# Patient Record
Sex: MALE | Race: WHITE
[De-identification: names, ages, dates, MRNs, and addresses within clinical notes are randomized per-mention and may not be internally consistent; named-entity substitution may affect disease eponyms.]

---

## 2017-07-24 ENCOUNTER — HOSPITAL ENCOUNTER (OUTPATIENT)
Dept: HOSPITAL 76 - SDS | Age: 32
Discharge: HOME | End: 2017-07-24
Attending: SURGERY
Payer: COMMERCIAL

## 2017-07-24 VITALS — SYSTOLIC BLOOD PRESSURE: 133 MMHG | DIASTOLIC BLOOD PRESSURE: 76 MMHG

## 2017-07-24 DIAGNOSIS — F17.210: ICD-10-CM

## 2017-07-24 DIAGNOSIS — L05.91: Primary | ICD-10-CM

## 2017-07-24 PROCEDURE — 11771 EXC PILONIDAL CYST XTNSV: CPT

## 2017-07-24 PROCEDURE — 0JB90ZZ EXCISION OF BUTTOCK SUBCUTANEOUS TISSUE AND FASCIA, OPEN APPROACH: ICD-10-PCS | Performed by: SURGERY

## 2017-07-25 NOTE — OPERATIVE REPORT
DATE OF SURGERY:  07/24/2017 00:00:00

 

PREOPERATIVE DIAGNOSIS:   Recurrent pilonidal cysts. 

 

POSTOPERATIVE DIAGNOSIS:   Recurrent pilonidal cysts.

 

NAME OF PROCEDURE:  Excision of pilonidal cyst

 

SURGEON:   Yari Jean MD.

 

 

ANESTHESIOLOGIST: Dr. Yeager.

 

INDICATION FOR PROCEDURE: This is a 32-year-old gentleman who presents with 
recurring pilonidal abscesses status post excision of pilonidal cyst in 2009, 
now with recurring pilonidal cysts in sinus.

 

FINDINGS: After obtaining informed consent from the patient, he was brought 
into the operating room, positioned on the operating table in the prone position
, taking note of pressure points. He was administered sedation by Anesthesia. 
He was administered 2 g of Ancef. He was then prepped and draped in the usual 
sterile fashion, and a timeout was taken according to protocol. Two sinus pits 
were located at the gluteal fold. These pits were excised using an 11-blade, 
circumferentially excising the cysts just around the opening. A contralateral 
incision to the left of the gluteal food was then created approximately 1.5 cm 
in length. This was deepened down with electrocautery through the subcutaneous 
tissue, down towards the presacral fascia using a curette. The pilonidal cysts 
at their base were excised using the curette through the lateral incision. 
Probes were inserted through both openings to ensure the entire cystic cavity 
was removed with a curette. Once this was performed, the cavities were 
copiously irrigated and hemostasis noted to be achieved. The incision was then 
closed with 2-0 Vicryl and 4-0 Monocryl. The cyst sites were then packed with 
quarter-inch iodoform packing. The Dermabond was applied to the lateral 
incision. An ABD pad was then applied and disposable underwear were placed. 
Patient was then taken to the recovery room in stable condition.

 

ESTIMATED BLOOD LOSS: Minimal.

 

COMPLICATIONS: None.

 

SPECIMENS: None. 

 

 

 

DD:07/24/2017 11:18:00  DT: 07/25/2017 17:23  JOB #: [id number]  EXT JOB #:[id number]

MTDD

## 2018-01-24 ENCOUNTER — HOSPITAL ENCOUNTER (EMERGENCY)
Dept: HOSPITAL 76 - ED | Age: 33
Discharge: HOME | End: 2018-01-24
Payer: COMMERCIAL

## 2018-01-24 VITALS — SYSTOLIC BLOOD PRESSURE: 126 MMHG | DIASTOLIC BLOOD PRESSURE: 81 MMHG

## 2018-01-24 DIAGNOSIS — F17.200: ICD-10-CM

## 2018-01-24 DIAGNOSIS — A08.4: Primary | ICD-10-CM

## 2018-01-24 PROCEDURE — 99283 EMERGENCY DEPT VISIT LOW MDM: CPT

## 2018-01-24 NOTE — ED PHYSICIAN DOCUMENTATION
PD HPI NVD





- Stated complaint


Stated Complaint: V/D





- Chief complaint


Chief Complaint: Abd Pain





- History obtained from


History obtained from: Patient





- History of Present Illness


Timing - onset: Last night


Timing - duration: Hours (12)


Timing - details: Abrupt onset


Associated symptoms: Other (diarrhea often last evening and some overnight. 

Vomiting started during the night into this morning.).  No: Fever, Abdominal 

pain, Near syncope / syncope


Contributing factors: Sick contact (his son had similar several days ago, 

lasted 2 days. Coworkers with similar as well.).  No: Bad food, Travel, Recent 

antibiotics


Worsened by: Eating


Similar symptoms before: Has not had sx before


Recently seen: Not recently seen





Review of Systems


Constitutional: reports: Myalgias.  denies: Fever, Chills


Nose: denies: Rhinorrhea / runny nose, Congestion


Throat: denies: Sore throat


Respiratory: denies: Cough


GI: reports: Nausea, Vomiting, Diarrhea.  denies: Abdominal Pain, Hematemesis, 

Bloody / black stool


: denies: Dysuria, Frequency


Neurologic: reports: Generalized weakness, Headache (mild).  denies: Focal 

weakness, Numbness, Difficulty speaking, Near syncope, Confused, Altered mental 

status





PD PAST MEDICAL HISTORY





- Past Medical History


Past Medical History: No


GI: Other (IBS)





- Past Surgical History


Past Surgical History: No





- Present Medications


Home Medications: 


 Ambulatory Orders











 Medication  Instructions  Recorded  Confirmed


 


raNITIdine [Zantac] 150 mg PO PRN PRN 07/24/17 07/24/17


 


Diphenoxylate HCl/Atropine 1 each PO Q6H PRN #12 tablet 01/24/18 





[Diphenoxylate-Atrop 2.5-0.025]   


 


Ondansetron Odt [Zofran] 4 mg TL Q6H PRN #15 tablet 01/24/18 


 


Ranitidine HCl [Acid Reducer] 75 mg PO DAILY 01/24/18 01/24/18














- Allergies


Allergies/Adverse Reactions: 


 Allergies











Allergy/AdvReac Type Severity Reaction Status Date / Time


 


amoxicillin Allergy  Unknown Verified 01/24/18 07:10


 


amphetamine Allergy  Unknown Verified 01/24/18 07:10





[From Adderall XR]     


 


amphetamine aspartate * Allergy  Anaphylaxis Verified 07/24/17 10:16





[From Adderall XR]     


 


amphetamine sulfate * Allergy  Anaphylaxis Verified 07/24/17 10:16





[From Adderall XR]     


 


dextroamphetamine Allergy  Unknown Verified 01/24/18 07:10





[From Adderall XR]     


 


dextroamphetamine saccharate Allergy  Anaphylaxis Verified 07/24/17 10:16





*     





[From Adderall XR]     


 


dextroamphetamine sulfate * Allergy  Anaphylaxis Verified 07/24/17 10:16





[From Adderall XR]     














- Social History


Does the pt smoke?: Yes


Smoking Status: Current every day smoker





- Immunizations


Immunizations are current?: Yes





PD ED PE NORMAL





- Vitals


Vital signs reviewed: Yes





- General


General: Alert and oriented X 3, No acute distress, Well developed/nourished





- HEENT


HEENT: Ears normal, Pharynx benign.  No: Moist mucous membranes





- Neck


Neck: Supple, no meningeal sign, No adenopathy





- Cardiac


Cardiac: RRR (mild tachycardic), No murmur





- Respiratory


Respiratory: Clear bilaterally





- Abdomen


Abdomen: Normal bowel sounds, Soft, Non tender, Non distended, No organomegaly





- Male 


Male : Deferred





- Rectal


Rectal: Deferred





- Back


Back: No CVA TTP





- Derm


Derm: Normal color, Warm and dry





- Neuro


Neuro: Alert and oriented X 3, No motor deficit, Normal speech





Results





- Vitals


Vitals: 


 Vital Signs - 24 hr











  01/24/18 01/24/18





  07:09 08:36


 


Temperature 36.9 C 


 


Heart Rate 100 86


 


Respiratory 17 14





Rate  


 


Blood Pressure 129/97 H 126/81 H


 


O2 Saturation 97 99








 Oxygen











O2 Source                      Room air

















PD MEDICAL DECISION MAKING





- ED course


Complexity details: re-evaluated patient (improved enough with PO meds and 

feels okay with going home with Rx. ), considered differential (not feeling 

lightheaded but generally weak. Shared decision to try oral meds and see if 

improves oral intake, and if not then to go to IV. ), d/w patient





Departure





- Departure


Disposition: 01 Home, Self Care


Clinical Impression: 


 Nausea vomiting and diarrhea, Viral gastroenteritis





Condition: Stable


Record reviewed to determine appropriate education?: Yes


Instructions:  ED Gastroenteritis Viral


Follow-Up: 


[facility] [place] [Provider Group]


Prescriptions: 


Diphenoxylate HCl/Atropine [Diphenoxylate-Atrop 2.5-0.025] 1 each PO Q6H PRN #

12 tablet


 PRN Reason: Diarrhea


Ondansetron Odt [Zofran] 4 mg TL Q6H PRN #15 tablet


 PRN Reason: Nausea / Vomiting


Comments: 


Small frequent fluids and bland food through the day.  Use ondansetron if 

needed for nausea and vomiting.  Lomotil if needed for diarrhea.  Tylenol if 

needed for pains or fevers.  Rest at home today and tomorrow.  Recheck if not 

improved over that time.  Return sooner if worsening, in particular high fevers

, focal abdominal pain, blood in your vomit or diarrhea, notable lightheadedness

, other concerns.


Forms:  Activity restrictions